# Patient Record
Sex: FEMALE | Race: BLACK OR AFRICAN AMERICAN | NOT HISPANIC OR LATINO | Employment: STUDENT | URBAN - METROPOLITAN AREA
[De-identification: names, ages, dates, MRNs, and addresses within clinical notes are randomized per-mention and may not be internally consistent; named-entity substitution may affect disease eponyms.]

---

## 2024-02-03 ENCOUNTER — HOSPITAL ENCOUNTER (EMERGENCY)
Facility: HOSPITAL | Age: 24
Discharge: HOME | End: 2024-02-03
Attending: EMERGENCY MEDICINE
Payer: COMMERCIAL

## 2024-02-03 VITALS
WEIGHT: 175 LBS | DIASTOLIC BLOOD PRESSURE: 95 MMHG | TEMPERATURE: 98.5 F | HEART RATE: 81 BPM | HEIGHT: 64 IN | BODY MASS INDEX: 29.88 KG/M2 | SYSTOLIC BLOOD PRESSURE: 127 MMHG | OXYGEN SATURATION: 100 % | RESPIRATION RATE: 16 BRPM
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
## 2024-02-03 DIAGNOSIS — S31.815A DOG BITE OF RIGHT BUTTOCK, INITIAL ENCOUNTER: Primary | ICD-10-CM

## 2024-02-03 PROCEDURE — 99282 EMERGENCY DEPT VISIT SF MDM: CPT

## 2024-02-03 RX ORDER — AMOXICILLIN AND CLAVULANATE POTASSIUM 875; 125 MG/1; MG/1
1 TABLET, FILM COATED ORAL 2 TIMES DAILY
Qty: 10 TABLET | Refills: 0 | Status: SHIPPED | OUTPATIENT
Start: 2024-02-03

## 2024-02-03 NOTE — ED PROVIDER NOTES
Subjective   History of Present Illness  23-year-old female presents emergency brought today with a dog bite to the right buttock.  She states that she lives in a car apartment complex and the person that lives above her his dog which she started it did bit her buttock.  States it did not tear through her leggings but there is a puncture into the skin.  Her last tetanus was about 3 years ago.  The dog she is heard has its immunizations but she knows that the neighbors to has a dog in its possession.  No other complaints.  Patient is not any notcompromise.    History provided by:  Patient   used: No    Animal Bite  Contact animal:  Dog  Animal bite location: Right buttock.  Time since incident:  2 hours  Pain details:     Quality:  Dull    Severity:  Mild    Timing:  Constant    Progression:  Unchanged  Incident location: Her apartment complex her upstairs neighbor.  Provoked: provoked (Startled the dog accidentally)    Notifications:  None  Animal's rabies vaccination status:  Up to date  Animal in possession: yes    Tetanus status:  Up to date  Relieved by:  Nothing  Worsened by:  Nothing  Ineffective treatments:  None tried  Associated symptoms: no fever, no numbness, no rash and no swelling        Review of Systems   Constitutional:  Negative for fever.   Skin:  Negative for rash.   Neurological:  Negative for numbness.       No past medical history on file.    Allergies   Allergen Reactions    Peanut-Containing Drug Products Anaphylaxis       No past surgical history on file.    No family history on file.    Social History     Socioeconomic History    Marital status: Single           Objective   Physical Exam  Vitals and nursing note reviewed.   Constitutional:       General: She is not in acute distress.     Appearance: She is well-developed. She is not diaphoretic.   HENT:      Head: Normocephalic and atraumatic.      Nose: Nose normal.   Eyes:      General: No scleral icterus.      Conjunctiva/sclera: Conjunctivae normal.   Pulmonary:      Effort: Pulmonary effort is normal. No respiratory distress.   Musculoskeletal:         General: Normal range of motion.      Cervical back: Normal range of motion and neck supple.   Skin:     General: Skin is warm and dry.      Comments: Right buttock has 1 single small puncture wound does not appear to be exceptionally deep.  There is small amount of oozing bleeding.  There is no surrounding ecchymosis.   Neurological:      Mental Status: She is alert and oriented to person, place, and time.   Psychiatric:         Behavior: Behavior normal.         Procedures           ED Course  ED Course as of 02/03/24 1438   Sat Feb 03, 2024   1438 Dog bite by the neighbors dog which is still in the possession of the neighbor.  Ms. Barton her immunizations are up-to-date.  She was started on Augmentin for about 5 days.  Advised if this begins to look infected increasing redness tenderness or other problems to return to the emergency department. [ARCENIO]      ED Course User Index  [ARCENIO] Chandler Veronica PA                                             Medical Decision Making  Problems Addressed:  Dog bite of right buttock, initial encounter: complicated acute illness or injury    Risk  Prescription drug management.        Final diagnoses:   Dog bite of right buttock, initial encounter       ED Disposition  ED Disposition       ED Disposition   Discharge    Condition   Stable    Comment   --               PATIENT CONNECTION - [facility] [id number] [phone]    Call for appointment         Medication List        New Prescriptions      amoxicillin-clavulanate 875-125 MG per tablet  Commonly known as: AUGMENTIN  Take 1 tablet by mouth 2 (Two) Times a Day.               Where to Get Your Medications        These medications were sent to [facility] #[id number] - [address] AT [facility] & [address] - [phone]  -  [phone] FX  [address] [facility] [address]      Phone: [phone]   amoxicillin-clavulanate 875-125 MG per tablet